# Patient Record
Sex: MALE | Race: WHITE | Employment: FULL TIME | URBAN - METROPOLITAN AREA
[De-identification: names, ages, dates, MRNs, and addresses within clinical notes are randomized per-mention and may not be internally consistent; named-entity substitution may affect disease eponyms.]

---

## 2017-07-22 ENCOUNTER — HOSPITAL ENCOUNTER (EMERGENCY)
Age: 37
Discharge: HOME | End: 2017-07-23
Attending: EMERGENCY MEDICINE
Payer: MEDICAID

## 2017-07-22 VITALS
HEART RATE: 99 BPM | RESPIRATION RATE: 16 BRPM | OXYGEN SATURATION: 98 % | HEIGHT: 70 IN | DIASTOLIC BLOOD PRESSURE: 93 MMHG | TEMPERATURE: 97.7 F | WEIGHT: 190 LBS | SYSTOLIC BLOOD PRESSURE: 130 MMHG | BODY MASS INDEX: 27.2 KG/M2

## 2017-07-22 DIAGNOSIS — T50.901A DRUG OVERDOSE, ACCIDENTAL OR UNINTENTIONAL, INITIAL ENCOUNTER: Primary | ICD-10-CM

## 2017-07-22 PROCEDURE — 99284 EMERGENCY DEPT VISIT MOD MDM: CPT

## 2017-07-22 RX ORDER — ONDANSETRON 4 MG/1
4 TABLET, FILM COATED ORAL ONCE
Status: DISCONTINUED | OUTPATIENT
Start: 2017-07-22 | End: 2017-07-23 | Stop reason: HOSPADM

## 2017-07-22 ASSESSMENT — ENCOUNTER SYMPTOMS
NAUSEA: 0
VOMITING: 0
ABDOMINAL PAIN: 0
COLOR CHANGE: 0
SHORTNESS OF BREATH: 0
COUGH: 0
EYE PAIN: 0

## 2020-10-23 ENCOUNTER — HOSPITAL ENCOUNTER (EMERGENCY)
Age: 40
Discharge: HOME | End: 2020-10-23
Attending: EMERGENCY MEDICINE
Payer: COMMERCIAL

## 2020-10-23 ENCOUNTER — APPOINTMENT (OUTPATIENT)
Dept: GENERAL RADIOLOGY | Age: 40
End: 2020-10-23
Payer: COMMERCIAL

## 2020-10-23 VITALS
BODY MASS INDEX: 25.77 KG/M2 | SYSTOLIC BLOOD PRESSURE: 126 MMHG | OXYGEN SATURATION: 94 % | HEIGHT: 70 IN | HEART RATE: 61 BPM | RESPIRATION RATE: 20 BRPM | TEMPERATURE: 97.7 F | WEIGHT: 180 LBS | DIASTOLIC BLOOD PRESSURE: 70 MMHG

## 2020-10-23 LAB
ABSOLUTE EOS #: 0.3 K/UL (ref 0–0.4)
ABSOLUTE IMMATURE GRANULOCYTE: ABNORMAL K/UL (ref 0–0.3)
ABSOLUTE LYMPH #: 2.3 K/UL (ref 1–4.8)
ABSOLUTE MONO #: 0.4 K/UL (ref 0.1–1.3)
ALBUMIN SERPL-MCNC: 4.3 G/DL (ref 3.5–5.2)
ALBUMIN/GLOBULIN RATIO: ABNORMAL (ref 1–2.5)
ALP BLD-CCNC: 53 U/L (ref 40–129)
ALT SERPL-CCNC: 36 U/L (ref 5–41)
ANION GAP SERPL CALCULATED.3IONS-SCNC: 11 MMOL/L (ref 9–17)
AST SERPL-CCNC: 23 U/L
BASOPHILS # BLD: 1 % (ref 0–2)
BASOPHILS ABSOLUTE: 0.1 K/UL (ref 0–0.2)
BILIRUB SERPL-MCNC: 1.22 MG/DL (ref 0.3–1.2)
BUN BLDV-MCNC: 12 MG/DL (ref 6–20)
BUN/CREAT BLD: ABNORMAL (ref 9–20)
CALCIUM SERPL-MCNC: 9.5 MG/DL (ref 8.6–10.4)
CHLORIDE BLD-SCNC: 104 MMOL/L (ref 98–107)
CO2: 22 MMOL/L (ref 20–31)
CREAT SERPL-MCNC: 0.94 MG/DL (ref 0.7–1.2)
DIFFERENTIAL TYPE: ABNORMAL
EOSINOPHILS RELATIVE PERCENT: 5 % (ref 0–4)
GFR AFRICAN AMERICAN: >60 ML/MIN
GFR NON-AFRICAN AMERICAN: >60 ML/MIN
GFR SERPL CREATININE-BSD FRML MDRD: ABNORMAL ML/MIN/{1.73_M2}
GFR SERPL CREATININE-BSD FRML MDRD: ABNORMAL ML/MIN/{1.73_M2}
GLUCOSE BLD-MCNC: 94 MG/DL (ref 70–99)
HCT VFR BLD CALC: 43.5 % (ref 41–53)
HEMOGLOBIN: 15.4 G/DL (ref 13.5–17.5)
IMMATURE GRANULOCYTES: ABNORMAL %
INR BLD: 1.1
LYMPHOCYTES # BLD: 34 % (ref 24–44)
MAGNESIUM: 2 MG/DL (ref 1.6–2.6)
MCH RBC QN AUTO: 31.4 PG (ref 26–34)
MCHC RBC AUTO-ENTMCNC: 35.5 G/DL (ref 31–37)
MCV RBC AUTO: 88.5 FL (ref 80–100)
MONOCYTES # BLD: 7 % (ref 1–7)
NRBC AUTOMATED: ABNORMAL PER 100 WBC
PDW BLD-RTO: 13.4 % (ref 11.5–14.9)
PLATELET # BLD: 239 K/UL (ref 150–450)
PLATELET ESTIMATE: ABNORMAL
PMV BLD AUTO: 7.1 FL (ref 6–12)
POTASSIUM SERPL-SCNC: 4 MMOL/L (ref 3.7–5.3)
PROTHROMBIN TIME: 14.3 SEC (ref 11.8–14.6)
RBC # BLD: 4.91 M/UL (ref 4.5–5.9)
RBC # BLD: ABNORMAL 10*6/UL
SEG NEUTROPHILS: 53 % (ref 36–66)
SEGMENTED NEUTROPHILS ABSOLUTE COUNT: 3.7 K/UL (ref 1.3–9.1)
SODIUM BLD-SCNC: 137 MMOL/L (ref 135–144)
TOTAL CK: 115 U/L (ref 39–308)
TOTAL PROTEIN: 7.2 G/DL (ref 6.4–8.3)
TROPONIN INTERP: NORMAL
TROPONIN INTERP: NORMAL
TROPONIN T: NORMAL NG/ML
TROPONIN T: NORMAL NG/ML
TROPONIN, HIGH SENSITIVITY: <6 NG/L (ref 0–22)
TROPONIN, HIGH SENSITIVITY: <6 NG/L (ref 0–22)
WBC # BLD: 6.9 K/UL (ref 3.5–11)
WBC # BLD: ABNORMAL 10*3/UL

## 2020-10-23 PROCEDURE — 82550 ASSAY OF CK (CPK): CPT

## 2020-10-23 PROCEDURE — 71045 X-RAY EXAM CHEST 1 VIEW: CPT

## 2020-10-23 PROCEDURE — 85610 PROTHROMBIN TIME: CPT

## 2020-10-23 PROCEDURE — 36415 COLL VENOUS BLD VENIPUNCTURE: CPT

## 2020-10-23 PROCEDURE — 6360000002 HC RX W HCPCS: Performed by: EMERGENCY MEDICINE

## 2020-10-23 PROCEDURE — 85025 COMPLETE CBC W/AUTO DIFF WBC: CPT

## 2020-10-23 PROCEDURE — 93005 ELECTROCARDIOGRAM TRACING: CPT | Performed by: EMERGENCY MEDICINE

## 2020-10-23 PROCEDURE — 83735 ASSAY OF MAGNESIUM: CPT

## 2020-10-23 PROCEDURE — 84484 ASSAY OF TROPONIN QUANT: CPT

## 2020-10-23 PROCEDURE — 6370000000 HC RX 637 (ALT 250 FOR IP): Performed by: EMERGENCY MEDICINE

## 2020-10-23 PROCEDURE — 96374 THER/PROPH/DIAG INJ IV PUSH: CPT

## 2020-10-23 PROCEDURE — 99285 EMERGENCY DEPT VISIT HI MDM: CPT

## 2020-10-23 PROCEDURE — 80053 COMPREHEN METABOLIC PANEL: CPT

## 2020-10-23 RX ORDER — MORPHINE SULFATE 4 MG/ML
4 INJECTION, SOLUTION INTRAMUSCULAR; INTRAVENOUS ONCE
Status: COMPLETED | OUTPATIENT
Start: 2020-10-23 | End: 2020-10-23

## 2020-10-23 RX ORDER — ACETAMINOPHEN 500 MG
500 TABLET ORAL 4 TIMES DAILY PRN
Qty: 120 TABLET | Refills: 0 | Status: SHIPPED | OUTPATIENT
Start: 2020-10-23 | End: 2021-12-10 | Stop reason: SINTOL

## 2020-10-23 RX ORDER — ACETAMINOPHEN 500 MG
1000 TABLET ORAL ONCE
Status: COMPLETED | OUTPATIENT
Start: 2020-10-23 | End: 2020-10-23

## 2020-10-23 RX ADMIN — ACETAMINOPHEN 1000 MG: 500 TABLET, FILM COATED ORAL at 18:09

## 2020-10-23 RX ADMIN — MORPHINE SULFATE 4 MG: 4 INJECTION, SOLUTION INTRAMUSCULAR; INTRAVENOUS at 18:09

## 2020-10-23 ASSESSMENT — ENCOUNTER SYMPTOMS
ABDOMINAL PAIN: 0
CHEST TIGHTNESS: 1
SHORTNESS OF BREATH: 0

## 2020-10-23 ASSESSMENT — PAIN SCALES - GENERAL
PAINLEVEL_OUTOF10: 7
PAINLEVEL_OUTOF10: 3

## 2020-10-23 ASSESSMENT — PAIN DESCRIPTION - LOCATION: LOCATION: CHEST

## 2020-10-23 ASSESSMENT — HEART SCORE: ECG: 1

## 2020-10-23 ASSESSMENT — PAIN DESCRIPTION - PAIN TYPE: TYPE: ACUTE PAIN

## 2020-10-23 NOTE — ED PROVIDER NOTES
Carly    Pt Name: Laura Adkins  MRN: [id number]  [place] 1980  Date of evaluation: 10/23/20  CHIEF COMPLAINT       Chief Complaint   Patient presents with    Chest Pain     x1 year     HISTORY OF PRESENT ILLNESS     Chest Pain   Pain location:  Substernal area  Pain quality: aching    Pain radiates to:  Does not radiate  Pain severity:  Moderate  Onset quality:  Gradual  Duration:  6 months  Timing:  Intermittent  Progression:  Waxing and waning  Chronicity:  New  Relieved by:  Nothing  Worsened by:  Nothing  Ineffective treatments:  None tried  Associated symptoms: no abdominal pain, no palpitations and no shortness of breath      Has chronic low back pain, 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 Negative for shortness of breath. Cardiovascular: Positive for chest pain. Negative for palpitations and leg swelling. Gastrointestinal: Negative for abdominal pain. All other systems reviewed and are negative. PASTMEDICAL HISTORY   History reviewed. No pertinent past medical history. Past Problem List  There is no problem list on file for this patient. SURGICAL HISTORY     History reviewed. No pertinent surgical history. CURRENT MEDICATIONS       Previous Medications    No medications on file     ALLERGIES     has No Known Allergies. FAMILY HISTORY     has no family status information on file. SOCIAL HISTORY       Social History     Tobacco Use    Smoking status: Former Smoker   Substance Use Topics    Alcohol use: Not Currently    Drug use: Not Currently     Types: Marijuana     Comment: hx heroine use; denies currently 10/23/20     PHYSICAL EXAM     INITIAL VITALS: /84   Pulse 75   Temp 97.7 °F (36.5 °C) (Oral)   Resp 20   Ht 5' 10\" (1.778 m)   Wt 180 lb (81.6 kg)   SpO2 97%   BMI 25.83 kg/m²    Physical Exam  Vitals signs reviewed. Constitutional:       General: He is not in acute distress. Appearance: Normal appearance. He is well-developed. He is not diaphoretic. HENT:      Head: Normocephalic and atraumatic. Right Ear: External ear normal.      Left Ear: External ear normal.      Nose: Nose normal. No congestion. Mouth/Throat:      Mouth: Mucous membranes are moist.      Pharynx: Oropharynx is clear. Eyes:      General:         Right eye: No discharge. Left eye: No discharge. Conjunctiva/sclera: Conjunctivae normal.      Pupils: Pupils are equal, round, and reactive to light. Neck:      Musculoskeletal: Normal range of motion and neck supple. Trachea: No tracheal deviation. Cardiovascular:      Rate and Rhythm: Normal rate and regular rhythm. Pulses: Normal pulses. Heart sounds: Normal heart sounds. Pulmonary:      Effort: Pulmonary effort is normal. No respiratory distress. Breath sounds: Normal breath sounds. No stridor. No wheezing or rales. Abdominal:      Palpations: Abdomen is soft. Tenderness: There is no abdominal tenderness. There is no guarding or rebound. Musculoskeletal: Normal range of motion. General: No tenderness or deformity. Skin:     General: Skin is warm and dry. Capillary Refill: Capillary refill takes less than 2 seconds. Findings: No erythema or rash. Neurological:      General: No focal deficit present. Mental Status: He is alert and oriented to person, place, and time. Cranial Nerves: No cranial nerve deficit. Coordination: Coordination normal.   Psychiatric:         Mood and Affect: Mood normal.         Behavior: Behavior normal.         Thought Content:  Thought content normal.         Judgment: Judgment normal.         MEDICAL DECISION MAKING:   Do not suspect AD or PE or AMI or ACS  HEART score 2  Discussed with patient anticipatory guidance, discharge instructions, follow up [facility] PCP 24 hours for [place] 9127  Schedule an appointment as soon as possible for a visit in 1 day      DISCHARGE MEDICATIONS:  New Prescriptions    ACETAMINOPHEN (TYLENOL) 500 MG TABLET    Take 1 tablet by mouth 4 times daily as needed for Pain     Gab Collins MD  Attending Emergency Physician                    Gab Collins MD  10/23/20 2027

## 2020-10-23 NOTE — ED NOTES
Mode of arrival (squad #, walk in, police, etc) : walk in with dad         Chief complaint(s): chest pain         Arrival Note (brief scenario, treatment PTA, etc). :  Patient states he has had intermittent chest pain x1 year. Patient states the chest pain is located on his left side and denies the pain radiating elsewhere. Patient states today the pain worsened shortly after he ate food. Patient states he also had a headache around the time of the chest pain beginning. Patient states he went home and slept and no longer had a headache. Patient states the chest pain is still present. Patient is alert and oriented x4. Patient denies any SOB. C= \"Have you ever felt that you should Cut down on your drinking? \"  No  A= \"Have people Annoyed you by criticizing your drinking? \"  No  G= \"Have you ever felt bad or Guilty about your drinking? \"  No  E= \"Have you ever had a drink as an Eye-opener first thing in the morning to steady your nerves or to help a hangover? \"  No      Deferred []      Reason for deferring: N/A    *If yes to two or more: probable alcohol abuse. *       Nolvia Prince RN  10/23/20 [id number]

## 2020-10-23 NOTE — ED NOTES
Report given to Jacey Rubio from ED. Report method in person   The following was reviewed with receiving RN:   Current vital signs:  /84   Pulse 75   Temp 97.7 °F (36.5 °C) (Oral)   Resp 20   Ht 5' 10\" (1.778 m)   Wt 180 lb (81.6 kg)   SpO2 97%   BMI 25.83 kg/m²                      Any medication or safety alerts were reviewed. Any pending diagnostics and notifications were also reviewed, as well as any safety concerns or issues, abnormal labs, abnormal imaging, and abnormal assessment findings. Questions were answered.            Patricia Barrera RN  10/23/20 1342

## 2020-10-27 LAB
EKG ATRIAL RATE: 66 BPM
EKG P AXIS: 55 DEGREES
EKG P-R INTERVAL: 192 MS
EKG Q-T INTERVAL: 396 MS
EKG QRS DURATION: 88 MS
EKG QTC CALCULATION (BAZETT): 415 MS
EKG R AXIS: 36 DEGREES
EKG T AXIS: 23 DEGREES
EKG VENTRICULAR RATE: 66 BPM

## 2020-10-27 PROCEDURE — 93010 ELECTROCARDIOGRAM REPORT: CPT | Performed by: INTERNAL MEDICINE

## 2020-12-07 ENCOUNTER — TELEPHONE (OUTPATIENT)
Dept: INTERNAL MEDICINE CLINIC | Age: 40
End: 2020-12-07

## 2020-12-07 NOTE — TELEPHONE ENCOUNTER
Called patient. Patient answered. I informed patient that provider would be out of office.  Patient rescheduled appt for 12/28/20 1:00pm

## 2020-12-28 ENCOUNTER — OFFICE VISIT (OUTPATIENT)
Dept: INTERNAL MEDICINE CLINIC | Age: 40
End: 2020-12-28
Payer: COMMERCIAL

## 2020-12-28 VITALS
DIASTOLIC BLOOD PRESSURE: 72 MMHG | OXYGEN SATURATION: 98 % | SYSTOLIC BLOOD PRESSURE: 118 MMHG | WEIGHT: 194 LBS | BODY MASS INDEX: 27.77 KG/M2 | HEART RATE: 80 BPM | HEIGHT: 70 IN | TEMPERATURE: 98.2 F

## 2020-12-28 PROBLEM — R07.89 OTHER CHEST PAIN: Status: ACTIVE | Noted: 2020-12-28

## 2020-12-28 PROCEDURE — 99204 OFFICE O/P NEW MOD 45 MIN: CPT | Performed by: INTERNAL MEDICINE

## 2020-12-28 ASSESSMENT — PATIENT HEALTH QUESTIONNAIRE - PHQ9
2. FEELING DOWN, DEPRESSED OR HOPELESS: 0
1. LITTLE INTEREST OR PLEASURE IN DOING THINGS: 0
SUM OF ALL RESPONSES TO PHQ QUESTIONS 1-9: 0
SUM OF ALL RESPONSES TO PHQ QUESTIONS 1-9: 0
SUM OF ALL RESPONSES TO PHQ9 QUESTIONS 1 & 2: 0
SUM OF ALL RESPONSES TO PHQ QUESTIONS 1-9: 0

## 2020-12-28 ASSESSMENT — ENCOUNTER SYMPTOMS
ABDOMINAL DISTENTION: 0
EYE DISCHARGE: 0
COUGH: 0
COLOR CHANGE: 0
WHEEZING: 0
SHORTNESS OF BREATH: 0
EYE PAIN: 0
TROUBLE SWALLOWING: 0
BLOOD IN STOOL: 0
DIARRHEA: 0

## 2020-12-28 NOTE — PROGRESS NOTES
HENT: Negative for ear discharge and trouble swallowing. Eyes: Negative for pain and discharge. Respiratory: Negative for cough, shortness of breath and wheezing. Cardiovascular: Negative for chest pain and palpitations. Gastrointestinal: Negative for abdominal distention, blood in stool and diarrhea. Endocrine: Negative for polydipsia and polyphagia. Genitourinary: Negative for difficulty urinating and frequency. Musculoskeletal: Negative for gait problem, myalgias and neck pain. Skin: Negative for color change and rash. Allergic/Immunologic: Negative for environmental allergies and food allergies. Neurological: Negative for dizziness and headaches. Hematological: Negative for adenopathy. Does not bruise/bleed easily. Psychiatric/Behavioral: Negative for behavioral problems and sleep disturbance. Objective:     Physical Exam  Constitutional:       Appearance: He is well-developed. He is not diaphoretic. HENT:      Head: Normocephalic and atraumatic. Eyes:      General:         Right eye: No discharge. Left eye: No discharge. Extraocular Movements:      Right eye: Normal extraocular motion. Left eye: Normal extraocular motion. Conjunctiva/sclera: Conjunctivae normal.      Right eye: Right conjunctiva is not injected. Left eye: Left conjunctiva is not injected. Neck:      Musculoskeletal: Normal range of motion and neck supple. No edema or erythema. Thyroid: No thyroid mass or thyromegaly. Vascular: No JVD. Cardiovascular:      Rate and Rhythm: Normal rate and regular rhythm. Heart sounds: No murmur. No friction rub. Pulmonary:      Effort: Pulmonary effort is normal. No tachypnea, bradypnea, accessory muscle usage or respiratory distress. Breath sounds: Normal breath sounds. No wheezing or rales. Abdominal:      General: Bowel sounds are normal. There is no distension. Palpations: Abdomen is soft. Tenderness: There is no abdominal tenderness. There is no rebound. Musculoskeletal: Normal range of motion. General: No tenderness. Lymphadenopathy:      Head:      Right side of head: No submental or submandibular adenopathy. Left side of head: No submental or submandibular adenopathy. Cervical: No cervical adenopathy. Skin:     General: Skin is warm. Coloration: Skin is not pale. Findings: No bruising, ecchymosis or rash. Neurological:      Mental Status: He is alert and oriented to person, place, and time. Cranial Nerves: No cranial nerve deficit. Sensory: No sensory deficit. Motor: No atrophy or abnormal muscle tone. Coordination: Coordination normal.   Psychiatric:         Mood and Affect: Mood is not anxious. Affect is not angry. Speech: Speech is not slurred. Behavior: Behavior normal. Behavior is not aggressive. Thought Content: Thought content does not include homicidal ideation. Cognition and Memory: Memory is not impaired. /72   Pulse 80   Temp 98.2 °F (36.8 °C)   Ht 5' 10\" (1.778 m)   Wt 194 lb (88 kg)   SpO2 98%   BMI 27.84 kg/m²     Assessment:       Diagnosis Orders   1. Other chest pain  Stress test, myoview   2. Screening for hyperlipidemia  Lipid, Fasting             Plan:      Return in about 1 month (around 1/28/2021). Orders Placed This Encounter   Procedures    Lipid, Fasting     Standing Status:   Future     Standing Expiration Date:   12/23/2021    Stress test, myoview     Order Specific Question:   Reason for Exam?     Answer:   Chest pain     No orders of the defined types were placed in this encounter.    aytpical chest pain  canabis use daily  Will do stress  If negative treat for gerd   Trial of otc prolosec Patient given educational materials - see patient instructions. Discussed use, benefit, and side effects of prescribed medications. All patientquestions answered. Pt voiced understanding. Reviewed health maintenance. Instructedto continue current medications, diet and exercise. Patient agreed with treatmentplan. Follow up as directed.      Electronically signed by Basil Valenzuela MD on 12/28/2020 at 1:30 PM

## 2020-12-28 NOTE — PROGRESS NOTES
Visit Information    Have you changed or started any medications since your last visit including any over-the-counter medicines, vitamins, or herbal medicines? no   Are you having any side effects from any of your medications? -  no  Have you stopped taking any of your medications? Is so, why? -  no    Have you seen any other physician or provider since your last visit? No  Have you had any other diagnostic tests since your last visit? No  Have you been seen in the emergency room and/or had an admission to a hospital since we last saw you? yes  Have you had your routine dental cleaning in the past 6 months? yes -     Have you activated your [employer] account? If not, what are your barriers?  No:      No care team member to display    Medical History Review  Past Medical, Family, and Social History reviewed and does not contribute to the patient presenting condition    Health Maintenance   Topic Date Due    Hepatitis C screen  1980    Varicella vaccine (1 of 2 - 2-dose childhood series) 09/13/1981    HIV screen  09/13/1995    DTaP/Tdap/Td vaccine (1 - Tdap) 09/13/1999    Flu vaccine (1) 09/01/2020    Lipid screen  09/13/2020    Hepatitis A vaccine  Aged Out    Hepatitis B vaccine  Aged Out    Hib vaccine  Aged Out    Meningococcal (ACWY) vaccine  Aged Out    Pneumococcal 0-64 years Vaccine  Aged [place]

## 2021-02-02 ENCOUNTER — OFFICE VISIT (OUTPATIENT)
Dept: INTERNAL MEDICINE CLINIC | Age: 41
End: 2021-02-02
Payer: COMMERCIAL

## 2021-02-02 VITALS
BODY MASS INDEX: 28.06 KG/M2 | TEMPERATURE: 98.5 F | HEART RATE: 98 BPM | DIASTOLIC BLOOD PRESSURE: 74 MMHG | WEIGHT: 196 LBS | SYSTOLIC BLOOD PRESSURE: 130 MMHG | HEIGHT: 70 IN | OXYGEN SATURATION: 87 %

## 2021-02-02 DIAGNOSIS — Z13.220 SCREENING FOR HYPERLIPIDEMIA: ICD-10-CM

## 2021-02-02 DIAGNOSIS — R07.89 OTHER CHEST PAIN: Primary | ICD-10-CM

## 2021-02-02 PROCEDURE — 99213 OFFICE O/P EST LOW 20 MIN: CPT | Performed by: INTERNAL MEDICINE

## 2021-02-02 RX ORDER — OMEPRAZOLE 20 MG/1
20 CAPSULE, DELAYED RELEASE ORAL DAILY
Qty: 30 CAPSULE | Refills: 0 | Status: SHIPPED | OUTPATIENT
Start: 2021-02-02 | End: 2021-12-10

## 2021-02-02 ASSESSMENT — ENCOUNTER SYMPTOMS
EYE PAIN: 0
DIARRHEA: 0
BLOOD IN STOOL: 0
COUGH: 0
TROUBLE SWALLOWING: 0
COLOR CHANGE: 0
EYE DISCHARGE: 0
ABDOMINAL DISTENTION: 0
SHORTNESS OF BREATH: 0
WHEEZING: 0

## 2021-02-02 ASSESSMENT — PATIENT HEALTH QUESTIONNAIRE - PHQ9
1. LITTLE INTEREST OR PLEASURE IN DOING THINGS: 0
SUM OF ALL RESPONSES TO PHQ9 QUESTIONS 1 & 2: 0
2. FEELING DOWN, DEPRESSED OR HOPELESS: 0

## 2021-02-02 NOTE — PROGRESS NOTES
[address] [address]  Dept: [phone]  Dept Fax: [phone]    Tesfaye Baum is a 36 y.o. male who presents today for his medical conditions/complaintsas noted below. Tesfaye Baum is c/o of   Chief Complaint   Patient presents with    Chest Pain     follow up          HPI:     Chest apin worsei with pop pepsi        No results found for: LABA1C          ( goal A1Cis < 7)   No results found for: LABMICR  No results found for: LDLCHOLESTEROL, LDLCALC    (goal LDL is <100)   AST (U/L)   Date Value   10/23/2020 23     ALT (U/L)   Date Value   10/23/2020 36     BUN (mg/dL)   Date Value   10/23/2020 12     BP Readings from Last 3 Encounters:   02/02/21 130/74   12/28/20 118/72   10/23/20 126/70          (goal 120/80)    No past medical history on file. No past surgical history on file. No family history on file. Social History     Tobacco Use    Smoking status: Former Smoker    Smokeless tobacco: Never Used   Substance Use Topics    Alcohol use: Not Currently      Current Outpatient Medications   Medication Sig Dispense Refill    omeprazole (PRILOSEC) 20 MG delayed release capsule Take 1 capsule by mouth Daily 30 capsule 0    acetaminophen (TYLENOL) 500 MG tablet Take 1 tablet by mouth 4 times daily as needed for Pain (Patient not taking: Reported on 12/28/2020) 120 tablet 0     No current facility-administered medications for this visit.       No Known Allergies    Health Maintenance   Topic Date Due    Hepatitis C screen  1980    Varicella vaccine (1 of 2 - 2-dose childhood series) 09/13/1981    HIV screen  09/13/1995    DTaP/Tdap/Td vaccine (1 - Tdap) 09/13/1999    Flu vaccine (1) 09/01/2020    Lipid screen  09/13/2020    Hepatitis A vaccine  Aged Out    Hepatitis B vaccine  Aged Out    Hib vaccine  Aged Out    Meningococcal (ACWY) vaccine  Aged Out    Pneumococcal 0-64 years Vaccine  Aged Out       Subjective: Review of Systems   Constitutional: Negative for appetite change, diaphoresis and fatigue. HENT: Negative for ear discharge and trouble swallowing. Eyes: Negative for pain and discharge. Respiratory: Negative for cough, shortness of breath and wheezing. Chest pain worse with coke pepsis   Cardiovascular: Negative for chest pain and palpitations. Gastrointestinal: Negative for abdominal distention, blood in stool and diarrhea. Endocrine: Negative for polydipsia and polyphagia. Genitourinary: Negative for difficulty urinating and frequency. Musculoskeletal: Negative for gait problem, myalgias and neck pain. Skin: Negative for color change and rash. Allergic/Immunologic: Negative for environmental allergies and food allergies. Neurological: Negative for dizziness and headaches. Hematological: Negative for adenopathy. Does not bruise/bleed easily. Psychiatric/Behavioral: Negative for behavioral problems and sleep disturbance. Objective:     Physical Exam  Constitutional:       Appearance: He is well-developed. He is not diaphoretic. HENT:      Head: Normocephalic and atraumatic. Eyes:      General:         Right eye: No discharge. Left eye: No discharge. Extraocular Movements:      Right eye: Normal extraocular motion. Left eye: Normal extraocular motion. Conjunctiva/sclera: Conjunctivae normal.      Right eye: Right conjunctiva is not injected. Left eye: Left conjunctiva is not injected. Neck:      Musculoskeletal: Normal range of motion and neck supple. No edema or erythema. Thyroid: No thyroid mass or thyromegaly. Vascular: No JVD. Cardiovascular:      Rate and Rhythm: Normal rate and regular rhythm. Heart sounds: No murmur. No friction rub. Pulmonary:      Effort: Pulmonary effort is normal. No tachypnea, bradypnea, accessory muscle usage or respiratory distress. Breath sounds: Normal breath sounds. No wheezing or rales. Abdominal:      General: Bowel sounds are normal. There is no distension. Palpations: Abdomen is soft. Tenderness: There is no abdominal tenderness. There is no rebound. Musculoskeletal: Normal range of motion. General: No tenderness. Lymphadenopathy:      Head:      Right side of head: No submental or submandibular adenopathy. Left side of head: No submental or submandibular adenopathy. Cervical: No cervical adenopathy. Skin:     General: Skin is warm. Coloration: Skin is not pale. Findings: No bruising, ecchymosis or rash. Neurological:      Mental Status: He is alert and oriented to person, place, and time. Cranial Nerves: No cranial nerve deficit. Sensory: No sensory deficit. Motor: No atrophy or abnormal muscle tone. Coordination: Coordination normal.   Psychiatric:         Mood and Affect: Mood is not anxious. Affect is not angry. Speech: Speech is not slurred. Behavior: Behavior normal. Behavior is not aggressive. Thought Content: Thought content does not include homicidal ideation. Cognition and Memory: Memory is not impaired. /74   Pulse 98   Temp 98.5 °F (36.9 °C)   Ht 5' 10\" (1.778 m)   Wt 196 lb (88.9 kg)   SpO2 (!) 87%   BMI 28.12 kg/m²     Assessment:       Diagnosis Orders   1. Other chest pain     2. Screening for hyperlipidemia               Plan:      No follow-ups on file. No orders of the defined types were placed in this encounter.     Orders Placed This Encounter   Medications    omeprazole (PRILOSEC) 20 MG delayed release capsule     Sig: Take 1 capsule by mouth Daily     Dispense:  30 capsule     Refill:  0    atypical chest pain much better    Likely gerd vs gastrtiis  Pt did not do stress test  Low suspicion for cardiac  Worse with coke and pepsi  Will do  Trial of ppi  See back in a  Month If not better gi for egd     Patient given educational materials - see patient instructions. Discussed use, benefit, and side effects of prescribed medications. All patientquestions answered. Pt voiced understanding. Reviewed health maintenance. Instructedto continue current medications, diet and exercise. Patient agreed with treatmentplan. Follow up as directed.      Electronically signed by Mumtaz Medina MD on 2/2/2021 at 1:44 PM

## 2021-03-08 ENCOUNTER — OFFICE VISIT (OUTPATIENT)
Dept: INTERNAL MEDICINE CLINIC | Age: 41
End: 2021-03-08
Payer: COMMERCIAL

## 2021-03-08 VITALS
BODY MASS INDEX: 28.35 KG/M2 | WEIGHT: 198 LBS | HEART RATE: 83 BPM | OXYGEN SATURATION: 97 % | DIASTOLIC BLOOD PRESSURE: 74 MMHG | TEMPERATURE: 97.4 F | SYSTOLIC BLOOD PRESSURE: 122 MMHG | HEIGHT: 70 IN

## 2021-03-08 DIAGNOSIS — K21.00 GASTROESOPHAGEAL REFLUX DISEASE WITH ESOPHAGITIS WITHOUT HEMORRHAGE: Primary | ICD-10-CM

## 2021-03-08 DIAGNOSIS — Z13.220 SCREENING FOR HYPERLIPIDEMIA: ICD-10-CM

## 2021-03-08 DIAGNOSIS — R07.89 OTHER CHEST PAIN: ICD-10-CM

## 2021-03-08 PROCEDURE — 99214 OFFICE O/P EST MOD 30 MIN: CPT | Performed by: INTERNAL MEDICINE

## 2021-03-08 ASSESSMENT — ENCOUNTER SYMPTOMS
COLOR CHANGE: 0
DIARRHEA: 0
ABDOMINAL DISTENTION: 0
COUGH: 0
TROUBLE SWALLOWING: 0
BLOOD IN STOOL: 0
SHORTNESS OF BREATH: 0
WHEEZING: 0
EYE DISCHARGE: 0
EYE PAIN: 0

## 2021-03-08 NOTE — PROGRESS NOTES
[address] [address]  Dept: [phone]  Dept Fax: [phone]    Ian Camacho is a 36 y.o. male who presents today for his medical conditions/complaintsas noted below. Ian Camacho is c/o of   Chief Complaint   Patient presents with    Chest Pain     follow up          HPI:     Chest pain  Better  But not present        No results found for: LABA1C          ( goal A1Cis < 7)   No results found for: LABMICR  No results found for: LDLCHOLESTEROL, LDLCALC    (goal LDL is <100)   AST (U/L)   Date Value   10/23/2020 23     ALT (U/L)   Date Value   10/23/2020 36     BUN (mg/dL)   Date Value   10/23/2020 12     BP Readings from Last 3 Encounters:   03/08/21 122/74   02/02/21 130/74   12/28/20 118/72          (goal 120/80)    No past medical history on file. No past surgical history on file. No family history on file. Social History     Tobacco Use    Smoking status: Former Smoker    Smokeless tobacco: Never Used   Substance Use Topics    Alcohol use: Not Currently      Current Outpatient Medications   Medication Sig Dispense Refill    omeprazole (PRILOSEC) 20 MG delayed release capsule Take 1 capsule by mouth Daily 30 capsule 0    acetaminophen (TYLENOL) 500 MG tablet Take 1 tablet by mouth 4 times daily as needed for Pain (Patient not taking: Reported on 3/8/2021) 120 tablet 0     No current facility-administered medications for this visit.       No Known Allergies    Health Maintenance   Topic Date Due    Hepatitis C screen  Never done    Varicella vaccine (1 of 2 - 2-dose childhood series) Never done    HIV screen  Never done    DTaP/Tdap/Td vaccine (1 - Tdap) Never done    Flu vaccine (1) Never done    Lipid screen  Never done    Hepatitis A vaccine  Aged Out    Hepatitis B vaccine  Aged Out    Hib vaccine  Aged Out    Meningococcal (ACWY) vaccine  Aged Out    Pneumococcal 0-64 years Vaccine  Aged Out       Subjective:     Review of Systems   Constitutional: Negative for appetite change, diaphoresis and fatigue. HENT: Negative for ear discharge and trouble swallowing. Eyes: Negative for pain and discharge. Respiratory: Negative for cough, shortness of breath and wheezing. Cardiovascular: Negative for chest pain and palpitations. Gastrointestinal: Negative for abdominal distention, blood in stool and diarrhea. Endocrine: Negative for polydipsia and polyphagia. Genitourinary: Negative for difficulty urinating and frequency. Musculoskeletal: Negative for gait problem, myalgias and neck pain. Skin: Negative for color change and rash. Allergic/Immunologic: Negative for environmental allergies and food allergies. Neurological: Negative for dizziness and headaches. Hematological: Negative for adenopathy. Does not bruise/bleed easily. Psychiatric/Behavioral: Negative for behavioral problems and sleep disturbance. Objective:     Physical Exam  Constitutional:       Appearance: He is well-developed. He is not diaphoretic. HENT:      Head: Normocephalic and atraumatic. Eyes:      General:         Right eye: No discharge. Left eye: No discharge. Extraocular Movements:      Right eye: Normal extraocular motion. Left eye: Normal extraocular motion. Conjunctiva/sclera: Conjunctivae normal.      Right eye: Right conjunctiva is not injected. Left eye: Left conjunctiva is not injected. Neck:      Musculoskeletal: Normal range of motion and neck supple. No edema or erythema. Thyroid: No thyroid mass or thyromegaly. Vascular: No JVD. Cardiovascular:      Rate and Rhythm: Normal rate and regular rhythm. Heart sounds: No murmur. No friction rub. Pulmonary:      Effort: Pulmonary effort is normal. No tachypnea, bradypnea, accessory muscle usage or respiratory distress. Breath sounds: Normal breath sounds. No wheezing or rales.    Abdominal:      General: Bowel sounds are normal. There is no distension. Palpations: Abdomen is soft. Tenderness: There is no abdominal tenderness. There is no rebound. Musculoskeletal: Normal range of motion. General: No tenderness. Lymphadenopathy:      Head:      Right side of head: No submental or submandibular adenopathy. Left side of head: No submental or submandibular adenopathy. Cervical: No cervical adenopathy. Skin:     General: Skin is warm. Coloration: Skin is not pale. Findings: No bruising, ecchymosis or rash. Neurological:      Mental Status: He is alert and oriented to person, place, and time. Cranial Nerves: No cranial nerve deficit. Sensory: No sensory deficit. Motor: No atrophy or abnormal muscle tone. Coordination: Coordination normal.   Psychiatric:         Mood and Affect: Mood is not anxious. Affect is not angry. Speech: Speech is not slurred. Behavior: Behavior normal. Behavior is not aggressive. Thought Content: Thought content does not include homicidal ideation. Cognition and Memory: Memory is not impaired. /74   Pulse 83   Temp 97.4 °F (36.3 °C)   Ht 5' 10\" (1.778 m)   Wt 198 lb (89.8 kg)   SpO2 97%   BMI 28.41 kg/m²     Assessment:       Diagnosis Orders   1. Gastroesophageal reflux disease with esophagitis without hemorrhage  Matilda Page MD, Gastroenterology, [address] [place] [address]   2. Screening for hyperlipidemia  Lipid, Fasting   3. Other chest pain  Matilda Page MD, Gastroenterology, [address] [place] [address] St:      Return in about 2 months (around 5/8/2021).     Orders Placed This Encounter   Procedures    NM HEPATOBILIARY     Standing Status:   Future     Standing Expiration Date:   3/8/2022     Order Specific Question:   Reason for exam:     Answer:   ? biliary diskinesia    Lipid, Fasting     Standing Status:   Future     Standing Expiration Date:   3/5/2022 Yany Lee MD, Gastroenterology, [place]     Referral Priority:   Routine     Referral Type:   Eval and Treat     Referral Reason:   Specialty Services Required     Referred to Provider:   Galo Rivera MD     Requested Specialty:   Gastroenterology     Number of Visits Requested:   1     No orders of the defined types were placed in this encounter. chest pain is better with ppi  But not resolved  Ref to gi  Will do hida scan  Cont ppi     Patient given educational materials - see patient instructions. Discussed use, benefit, and side effects of prescribed medications. All patientquestions answered. Pt voiced understanding. Reviewed health maintenance. Instructedto continue current medications, diet and exercise. Patient agreed with treatmentplan. Follow up as directed.      Electronically signed by Lokesh Mao MD on 3/8/2021 at 10:31 AM

## 2021-03-31 ENCOUNTER — TELEPHONE (OUTPATIENT)
Dept: GASTROENTEROLOGY | Age: 41
End: 2021-03-31

## 2021-03-31 NOTE — TELEPHONE ENCOUNTER
Attempt # 1 called [facility] for patient to return our call and schedule per referral     Attempt # 2 also mailing letter

## 2021-05-12 ENCOUNTER — TELEPHONE (OUTPATIENT)
Dept: INTERNAL MEDICINE CLINIC | Age: 41
End: 2021-05-12

## 2021-05-12 NOTE — TELEPHONE ENCOUNTER
Writer informed pt that he will be receiving a no show ltr in the mail for his missed appt w/ Dr. Aubery Merlin sch'd on 5/11/21. Pt voiced understanding. Ltr mailed.

## 2021-08-04 NOTE — TELEPHONE ENCOUNTER
Attempt # 3 called to schedule per referral had to leave a message for patient to call back     Final Attempt mailing letter

## 2021-12-10 ENCOUNTER — OFFICE VISIT (OUTPATIENT)
Dept: INTERNAL MEDICINE CLINIC | Age: 41
End: 2021-12-10
Payer: COMMERCIAL

## 2021-12-10 VITALS
SYSTOLIC BLOOD PRESSURE: 116 MMHG | DIASTOLIC BLOOD PRESSURE: 70 MMHG | BODY MASS INDEX: 26.54 KG/M2 | WEIGHT: 185 LBS | OXYGEN SATURATION: 98 % | HEART RATE: 67 BPM

## 2021-12-10 DIAGNOSIS — Z00.00 ENCOUNTER FOR WELLNESS EXAMINATION IN ADULT: Primary | ICD-10-CM

## 2021-12-10 PROCEDURE — 99396 PREV VISIT EST AGE 40-64: CPT | Performed by: INTERNAL MEDICINE

## 2021-12-10 SDOH — ECONOMIC STABILITY: TRANSPORTATION INSECURITY
IN THE PAST 12 MONTHS, HAS LACK OF TRANSPORTATION KEPT YOU FROM MEETINGS, WORK, OR FROM GETTING THINGS NEEDED FOR DAILY LIVING?: NO

## 2021-12-10 SDOH — ECONOMIC STABILITY: FOOD INSECURITY: WITHIN THE PAST 12 MONTHS, YOU WORRIED THAT YOUR FOOD WOULD RUN OUT BEFORE YOU GOT MONEY TO BUY MORE.: NEVER TRUE

## 2021-12-10 SDOH — ECONOMIC STABILITY: TRANSPORTATION INSECURITY
IN THE PAST 12 MONTHS, HAS THE LACK OF TRANSPORTATION KEPT YOU FROM MEDICAL APPOINTMENTS OR FROM GETTING MEDICATIONS?: NO

## 2021-12-10 SDOH — ECONOMIC STABILITY: FOOD INSECURITY: WITHIN THE PAST 12 MONTHS, THE FOOD YOU BOUGHT JUST DIDN'T LAST AND YOU DIDN'T HAVE MONEY TO GET MORE.: NEVER TRUE

## 2021-12-10 ASSESSMENT — SOCIAL DETERMINANTS OF HEALTH (SDOH): HOW HARD IS IT FOR YOU TO PAY FOR THE VERY BASICS LIKE FOOD, HOUSING, MEDICAL CARE, AND HEATING?: NOT HARD AT ALL

## 2021-12-10 NOTE — PROGRESS NOTES
[address] [facility] [address]  [place] [address]  Dept: [phone]  Dept Fax: [phone]     Name: Avel Ashley  : 1980           Chief Complaint   Patient presents with    Annual Exam       History of Present Illness:    [facility]  Here for adult wellness exam,  No complaints today,    Past Medical History:    No past medical history on file. Reviewed all health maintenance requirements and ordered appropriate tests  Health Maintenance Due   Topic Date Due    Hepatitis C screen  Never done    Varicella vaccine (1 of 2 - 2-dose childhood series) Never done    COVID-19 Vaccine (1) Never done    HIV screen  Never done    DTaP/Tdap/Td vaccine (1 - Tdap) Never done    Lipid screen  Never done    Flu vaccine (1) Never done       Past Surgical History:    No past surgical history on file. Medications:    No current outpatient medications on file. Allergies:      Patient has no known allergies. Social History:    Tobacco:    reports that he has quit smoking. He has never used smokeless tobacco.  Alcohol:      reports previous alcohol use. Drug Use:  reports previous drug use. Drug: Marijuana Lilianmariia Yee). Family History:    No family history on file.     Review of Systems:    Positive and Negative as described in HPI    Constitutional:  negative for  fevers, chills, sweats, fatigue, and weight loss  HEENT:  negative for vision or hearing changes,   Respiratory:  negative for shortness of breath, cough, or congestion  Cardiovascular:  negative for  chest pain, palpitations  Gastrointestinal:  negative for nausea, vomiting, diarrhea, constipation, abdominal pain  Genitourinary:  negative for frequency, dysuria  Integument/Breast:  negative for rash, skin lesions  Musculoskeletal:  negative for muscle aches or joint pain  Neurological:  negative for headaches, dizziness, lightheadedness, numbness, pain and tingling extrimities  Behavior/Psych:  negative for depression and anxiety      Physical Exam:    Vitals:  /70 (Site: Left Upper Arm)   Pulse 67   Wt 185 lb (83.9 kg)   SpO2 98%   BMI 26.54 kg/m²     General appearance - alert, well appearing, and in no acute distress  Mental status - oriented to person, place, and time with normal affect  Head - normocephalic and atraumatic  Eyes - pupils equal and reactive, extraocular eye movements intact, conjunctiva clear  Ears - hearing appears to be intact  Nose - no drainage noted  Mouth - mucous membranes moist  Neck - supple, no carotid bruits, thyroid not palpable  Chest - clear to auscultation, normal effort  Heart - normal rate, regular rhythm, no murmurs  Abdomen - soft, nontender, nondistended, bowel sounds present all four quadrants, no masses, hepatomegaly or splenomegaly  Neurological - normal speech, no focal findings or movement disorder noted, cranial nerves II through XII grossly intact  Extremities - peripheral pulses palpable, no pedal edema or calf pain with palpation  Skin - no gross lesions, rashes, or induration noted      Data:    Lab Results   Component Value Date     10/23/2020    K 4.0 10/23/2020     10/23/2020    CO2 22 10/23/2020    BUN 12 10/23/2020    CREATININE 0.94 10/23/2020    GLUCOSE 94 10/23/2020    PROT 7.2 10/23/2020    LABALBU 4.3 10/23/2020    BILITOT 1.22 10/23/2020    ALKPHOS 53 10/23/2020    AST 23 10/23/2020    ALT 36 10/23/2020     Lab Results   Component Value Date    WBC 6.9 10/23/2020    RBC 4.91 10/23/2020    HGB 15.4 10/23/2020    HCT 43.5 10/23/2020    MCV 88.5 10/23/2020    MCH 31.4 10/23/2020    MCHC 35.5 10/23/2020    RDW 13.4 10/23/2020     10/23/2020    MPV 7.1 10/23/2020     No results found for: TSH  No results found for: CHOL, HDL, PSA, LABA1C       Assessment & Plan:     Diagnosis Orders   1.  Encounter for wellness examination in adult  CBC Auto Differential    Comprehensive Metabolic Panel    Lipid Panel    Urinalysis       Here for  wellness exam,  We will do the basic blood work  CBC, CMP, lipid profile, urinalysis          Completed Refills   Requested Prescriptions      No prescriptions requested or ordered in this encounter     Return in about 6 months (around 6/10/2022). No orders of the defined types were placed in this encounter. Orders Placed This Encounter   Procedures    CBC Auto Differential     Standing Status:   Future     Standing Expiration Date:   12/10/2022    Comprehensive Metabolic Panel     Standing Status:   Future     Standing Expiration Date:   12/10/2022    Lipid Panel     Standing Status:   Future     Standing Expiration Date:   12/10/2022     Order Specific Question:   Is Patient Fasting?/# of Hours     Answer:   8-10 Hours    Urinalysis     Standing Status:   Future     Standing Expiration Date:   12/10/2022     Order Specific Question:   SPECIFY(EX-CATH,MIDSTREAM,CYSTO,ETC)?      Answer:   Mid Stream       Electronically signed by Tin Corona MD on 12/10/2021 at 9:56 AM

## 2021-12-10 NOTE — PROGRESS NOTES
Visit Information    Have you changed or started any medications since your last visit including any over-the-counter medicines, vitamins, or herbal medicines? no   Are you having any side effects from any of your medications? -  no  Have you stopped taking any of your medications? Is so, why? -  no    Have you seen any other physician or provider since your last visit? No  Have you had any other diagnostic tests since your last visit? No  Have you been seen in the emergency room and/or had an admission to a hospital since we last saw you? No  Have you had your routine dental cleaning in the past 6 months? yes -     Have you activated your [employer] account? If not, what are your barriers?  No:      Patient Care Team:  Shameka Larson MD as PCP - General (Internal Medicine)  Shameka Larson MD as PCP - [facility]    Medical History Review  Past Medical, Family, and Social History reviewed and does not contribute to the patient presenting condition    Health Maintenance   Topic Date Due    Hepatitis C screen  Never done    Varicella vaccine (1 of 2 - 2-dose childhood series) Never done    COVID-19 Vaccine (1) Never done    HIV screen  Never done    DTaP/Tdap/Td vaccine (1 - Tdap) Never done    Lipid screen  Never done    Flu vaccine (1) Never done    Hepatitis A vaccine  Aged Out    Hepatitis B vaccine  Aged Out    Hib vaccine  Aged Out    Meningococcal (ACWY) vaccine  Aged Out    Pneumococcal 0-64 years Vaccine  Aged Out

## 2022-06-06 ENCOUNTER — OFFICE VISIT (OUTPATIENT)
Dept: INTERNAL MEDICINE CLINIC | Age: 42
End: 2022-06-06
Payer: COMMERCIAL

## 2022-06-06 VITALS
HEIGHT: 70 IN | DIASTOLIC BLOOD PRESSURE: 74 MMHG | WEIGHT: 192 LBS | OXYGEN SATURATION: 100 % | SYSTOLIC BLOOD PRESSURE: 122 MMHG | BODY MASS INDEX: 27.49 KG/M2 | HEART RATE: 63 BPM

## 2022-06-06 DIAGNOSIS — K21.00 GASTROESOPHAGEAL REFLUX DISEASE WITH ESOPHAGITIS WITHOUT HEMORRHAGE: ICD-10-CM

## 2022-06-06 DIAGNOSIS — B36.9 SKIN DISEASE, FUNGAL: Primary | ICD-10-CM

## 2022-06-06 DIAGNOSIS — Z13.220 SCREENING FOR HYPERLIPIDEMIA: ICD-10-CM

## 2022-06-06 DIAGNOSIS — Z00.00 ENCOUNTER FOR WELL ADULT EXAM WITHOUT ABNORMAL FINDINGS: ICD-10-CM

## 2022-06-06 PROCEDURE — 99214 OFFICE O/P EST MOD 30 MIN: CPT | Performed by: INTERNAL MEDICINE

## 2022-06-06 ASSESSMENT — PATIENT HEALTH QUESTIONNAIRE - PHQ9
SUM OF ALL RESPONSES TO PHQ QUESTIONS 1-9: 0
SUM OF ALL RESPONSES TO PHQ9 QUESTIONS 1 & 2: 0
1. LITTLE INTEREST OR PLEASURE IN DOING THINGS: 0
SUM OF ALL RESPONSES TO PHQ QUESTIONS 1-9: 0
2. FEELING DOWN, DEPRESSED OR HOPELESS: 0
SUM OF ALL RESPONSES TO PHQ QUESTIONS 1-9: 0
SUM OF ALL RESPONSES TO PHQ QUESTIONS 1-9: 0

## 2022-06-06 ASSESSMENT — ENCOUNTER SYMPTOMS
ABDOMINAL DISTENTION: 0
EYE DISCHARGE: 0
EYE PAIN: 0
TROUBLE SWALLOWING: 0
BLOOD IN STOOL: 0
DIARRHEA: 0
COLOR CHANGE: 0
SHORTNESS OF BREATH: 0
COUGH: 0
WHEEZING: 0

## 2022-06-06 NOTE — PROGRESS NOTES
Visit Information    Have you changed or started any medications since your last visit including any over-the-counter medicines, vitamins, or herbal medicines? no   Are you having any side effects from any of your medications? -  no  Have you stopped taking any of your medications? Is so, why? -  no    Have you seen any other physician or provider since your last visit? No  Have you had any other diagnostic tests since your last visit? No  Have you been seen in the emergency room and/or had an admission to a hospital since we last saw you? No  Have you had your routine dental cleaning in the past 6 months? yes -     Have you activated your [employer] account? If not, what are your barriers?  No:      Patient Care Team:  Judith See MD as PCP - General (Internal Medicine)  Judith See MD as PCP - [facility] Provider    Medical History Review  Past Medical, Family, and Social History reviewed and does contribute to the patient presenting condition    Health Maintenance   Topic Date Due    Varicella vaccine (1 of 2 - 2-dose childhood series) Never done    HIV screen  Never done    Hepatitis C screen  Never done    DTaP/Tdap/Td vaccine (1 - Tdap) Never done    Lipids  Never done    COVID-19 Vaccine (3 - Booster for Moderna series) 10/20/2021    Depression Screen  02/02/2022    Flu vaccine (Season Ended) 09/01/2022    Hepatitis A vaccine  Aged Out    Hepatitis B vaccine  Aged Out    Hib vaccine  Aged Out    Meningococcal (ACWY) vaccine  Aged Out    Pneumococcal 0-64 years Vaccine  Aged Out

## 2022-06-06 NOTE — PROGRESS NOTES
Well Adult Note  Name: Jt Rice Date: 2022   MRN: [id number] Sex: Male   Age: 39 y.o. Ethnicity: Unavailable / Unknown   : 1980 Race: White (non-)      Hill Bettencourt is here for well adult exam.  History:  No np  No cp  Changing job  No smoker        Review of Systems   Constitutional: Negative for appetite change, diaphoresis and fatigue. HENT: Negative for ear discharge and trouble swallowing. Eyes: Negative for pain and discharge. Respiratory: Negative for cough, shortness of breath and wheezing. Cardiovascular: Negative for chest pain and palpitations. Gastrointestinal: Negative for abdominal distention, blood in stool and diarrhea. Endocrine: Negative for polydipsia and polyphagia. Genitourinary: Negative for difficulty urinating and frequency. Musculoskeletal: Negative for gait problem, myalgias and neck pain. Skin: Positive for rash. Negative for color change. Allergic/Immunologic: Negative for environmental allergies and food allergies. Neurological: Negative for dizziness and headaches. Hematological: Negative for adenopathy. Does not bruise/bleed easily. Psychiatric/Behavioral: Negative for behavioral problems and sleep disturbance. No Known Allergies      Prior to Visit Medications    Medication Sig Taking? Authorizing Provider   miconazole (MICOTIN) 2 % cream Apply topically 2 times daily. Yes Wali Butcher MD       No past medical history on file. No past surgical history on file. No family history on file.     Social History     Tobacco Use    Smoking status: Former Smoker    Smokeless tobacco: Never Used   Substance Use Topics    Alcohol use: Not Currently    Drug use: Not Currently     Types: Marijuana (Weed)     Comment: hx heroine use; denies currently 10/23/20       Objective   /74   Pulse 63   Ht 5' 10\" (1.778 m)   Wt 192 lb (87.1 kg)   SpO2 100%   BMI 27.55 kg/m²   Wt Readings from Last 3 Encounters: 06/06/22 192 lb (87.1 kg)   12/10/21 185 lb (83.9 kg)   03/08/21 198 lb (89.8 kg)     There were no vitals filed for this visit. Physical Exam  Constitutional:       Appearance: He is well-developed. He is not diaphoretic. HENT:      Head: Normocephalic and atraumatic. Eyes:      General:         Right eye: No discharge. Left eye: No discharge. Extraocular Movements:      Right eye: Normal extraocular motion. Left eye: Normal extraocular motion. Conjunctiva/sclera: Conjunctivae normal.      Right eye: Right conjunctiva is not injected. Left eye: Left conjunctiva is not injected. Neck:      Thyroid: No thyroid mass or thyromegaly. Vascular: No JVD. Cardiovascular:      Rate and Rhythm: Normal rate and regular rhythm. Heart sounds: No murmur heard. No friction rub. Pulmonary:      Effort: Pulmonary effort is normal. No tachypnea, bradypnea, accessory muscle usage or respiratory distress. Breath sounds: Normal breath sounds. No wheezing or rales. Abdominal:      General: Bowel sounds are normal. There is no distension. Palpations: Abdomen is soft. Tenderness: There is no abdominal tenderness. There is no rebound. Musculoskeletal:         General: No tenderness. Normal range of motion. Cervical back: Normal range of motion and neck supple. No edema or erythema. Lymphadenopathy:      Head:      Right side of head: No submental or submandibular adenopathy. Left side of head: No submental or submandibular adenopathy. Cervical: No cervical adenopathy. Skin:     General: Skin is warm. Coloration: Skin is not pale. Findings: No bruising, ecchymosis or rash. Comments: Skin fungal in left arm   Neurological:      Mental Status: He is alert and oriented to person, place, and time. Cranial Nerves: No cranial nerve deficit. Sensory: No sensory deficit. Motor: No atrophy or abnormal muscle tone. Coordination: Coordination normal.   Psychiatric:         Mood and Affect: Mood is not anxious. Affect is not angry. Speech: Speech is not slurred. Behavior: Behavior normal. Behavior is not aggressive. Thought Content: Thought content does not include homicidal ideation. Cognition and Memory: Memory is not impaired. Assessment   Plan   1. Skin disease, fungal  2. Screening for hyperlipidemia  -     Lipid Panel; Future  3. Encounter for well adult exam without abnormal findings  -     CBC with Auto Differential; Future  -     Comprehensive Metabolic Panel; Future  4. Gastroesophageal reflux disease with esophagitis without hemorrhage         Personalized Preventive Plan   Current Health Maintenance Status  Immunization History   Administered Date(s) Administered    COVID-19, Ismael Batres, Primary or Immunocompromised, PF, 100mcg/0.5mL 04/22/2021, 05/20/2021        Health Maintenance   Topic Date Due    Varicella vaccine (1 of 2 - 2-dose childhood series) Never done    HIV screen  Never done    Hepatitis C screen  Never done    DTaP/Tdap/Td vaccine (1 - Tdap) Never done    Lipids  Never done    COVID-19 Vaccine (3 - Booster for Moderna series) 10/20/2021    Depression Screen  02/02/2022    Flu vaccine (Season Ended) 09/01/2022    Hepatitis A vaccine  Aged Out    Hepatitis B vaccine  Aged Out    Hib vaccine  Aged Out    Meningococcal (ACWY) vaccine  Aged Out    Pneumococcal 0-64 years Vaccine  Aged Out     Recommendations for [place] Due: see orders and patient instructions/AVS.    No follow-ups on file.

## 2023-12-12 ENCOUNTER — OFFICE VISIT (OUTPATIENT)
Dept: INTERNAL MEDICINE CLINIC | Age: 43
End: 2023-12-12
Payer: COMMERCIAL

## 2023-12-12 VITALS
OXYGEN SATURATION: 97 % | WEIGHT: 188 LBS | DIASTOLIC BLOOD PRESSURE: 66 MMHG | HEIGHT: 70 IN | HEART RATE: 56 BPM | BODY MASS INDEX: 26.92 KG/M2 | SYSTOLIC BLOOD PRESSURE: 116 MMHG

## 2023-12-12 DIAGNOSIS — Z00.00 WELL ADULT EXAM: ICD-10-CM

## 2023-12-12 DIAGNOSIS — M25.531 WRIST PAIN, RIGHT: Primary | ICD-10-CM

## 2023-12-12 DIAGNOSIS — N52.9 ERECTILE DYSFUNCTION, UNSPECIFIED ERECTILE DYSFUNCTION TYPE: ICD-10-CM

## 2023-12-12 PROCEDURE — 99213 OFFICE O/P EST LOW 20 MIN: CPT | Performed by: INTERNAL MEDICINE

## 2023-12-12 RX ORDER — ACETAMINOPHEN 325 MG/1
650 TABLET ORAL EVERY 6 HOURS PRN
COMMUNITY

## 2023-12-12 RX ORDER — PREDNISONE 20 MG/1
20 TABLET ORAL DAILY
Qty: 7 TABLET | Refills: 0 | Status: SHIPPED | OUTPATIENT
Start: 2023-12-12 | End: 2023-12-19

## 2023-12-12 RX ORDER — SILDENAFIL 100 MG/1
100 TABLET, FILM COATED ORAL DAILY PRN
Qty: 30 TABLET | Refills: 1 | Status: SHIPPED | OUTPATIENT
Start: 2023-12-12

## 2023-12-12 SDOH — ECONOMIC STABILITY: FOOD INSECURITY: WITHIN THE PAST 12 MONTHS, THE FOOD YOU BOUGHT JUST DIDN'T LAST AND YOU DIDN'T HAVE MONEY TO GET MORE.: NEVER TRUE

## 2023-12-12 SDOH — ECONOMIC STABILITY: HOUSING INSECURITY
IN THE LAST 12 MONTHS, WAS THERE A TIME WHEN YOU DID NOT HAVE A STEADY PLACE TO SLEEP OR SLEPT IN A SHELTER (INCLUDING NOW)?: NO

## 2023-12-12 SDOH — ECONOMIC STABILITY: INCOME INSECURITY: HOW HARD IS IT FOR YOU TO PAY FOR THE VERY BASICS LIKE FOOD, HOUSING, MEDICAL CARE, AND HEATING?: NOT HARD AT ALL

## 2023-12-12 SDOH — ECONOMIC STABILITY: FOOD INSECURITY: WITHIN THE PAST 12 MONTHS, YOU WORRIED THAT YOUR FOOD WOULD RUN OUT BEFORE YOU GOT MONEY TO BUY MORE.: NEVER TRUE

## 2023-12-12 ASSESSMENT — ENCOUNTER SYMPTOMS
DIARRHEA: 0
COLOR CHANGE: 0
WHEEZING: 0
COUGH: 0
TROUBLE SWALLOWING: 0
EYE DISCHARGE: 0
BLOOD IN STOOL: 0
ABDOMINAL DISTENTION: 0
SHORTNESS OF BREATH: 0
EYE PAIN: 0

## 2023-12-12 ASSESSMENT — PATIENT HEALTH QUESTIONNAIRE - PHQ9
2. FEELING DOWN, DEPRESSED OR HOPELESS: 0
SUM OF ALL RESPONSES TO PHQ QUESTIONS 1-9: 0
SUM OF ALL RESPONSES TO PHQ9 QUESTIONS 1 & 2: 0
SUM OF ALL RESPONSES TO PHQ QUESTIONS 1-9: 0
SUM OF ALL RESPONSES TO PHQ QUESTIONS 1-9: 0
1. LITTLE INTEREST OR PLEASURE IN DOING THINGS: 0
SUM OF ALL RESPONSES TO PHQ QUESTIONS 1-9: 0

## 2023-12-12 NOTE — PROGRESS NOTES
[address] [address]  Dept: [phone]  Dept Fax: [phone]    Pearla Sandhoff is a 37 y.o. male who presents today for his medical conditions/complaintsas noted below. Pearla Sandhoff is c/o of   Chief Complaint   Patient presents with    Employment Physical         HPI:     Right hand pain and numbness  For four moth  Right handed            No results found for: \"LABA1C\"          ( goal A1Cis < 7)   No components found for: \"LABMICR\"  No results found for: \"LDLCHOLESTEROL\", \"LDLCALC\"    (goal LDL is <100)   AST (U/L)   Date Value   10/23/2020 23     ALT (U/L)   Date Value   10/23/2020 36     BUN (mg/dL)   Date Value   10/23/2020 12     BP Readings from Last 3 Encounters:   12/12/23 116/66   06/06/22 122/74   12/10/21 116/70          (goal 120/80)    No past medical history on file. No past surgical history on file. No family history on file. Social History     Tobacco Use    Smoking status: Former    Smokeless tobacco: Never   Substance Use Topics    Alcohol use: Not Currently      Current Outpatient Medications   Medication Sig Dispense Refill    acetaminophen (TYLENOL) 325 MG tablet Take 2 tablets by mouth every 6 hours as needed for Pain      predniSONE (DELTASONE) 20 MG tablet Take 1 tablet by mouth daily for 7 days 7 tablet 0    sildenafil (VIAGRA) 100 MG tablet Take 1 tablet by mouth daily as needed for Erectile Dysfunction 30 tablet 1    miconazole (MICOTIN) 2 % cream Apply topically 2 times daily. (Patient not taking: Reported on 12/12/2023) 1 each 1     No current facility-administered medications for this visit.      No Known Allergies    Health Maintenance   Topic Date Due    Hepatitis B vaccine (1 of 3 - 3-dose series) Never done    Varicella vaccine (1 of 2 - 2-dose childhood series) Never done    HIV screen  Never done    Hepatitis C screen  Never done    DTaP/Tdap/Td vaccine (1 - Tdap) Never done    Diabetes screen

## 2023-12-12 NOTE — PROGRESS NOTES
Visit Information    Have you changed or started any medications since your last visit including any over-the-counter medicines, vitamins, or herbal medicines? no   Are you having any side effects from any of your medications? -  no  Have you stopped taking any of your medications? Is so, why? -  no    Have you seen any other physician or provider since your last visit? No  Have you had any other diagnostic tests since your last visit? No  Have you been seen in the emergency room and/or had an admission to a hospital since we last saw you? No  Have you had your routine dental cleaning in the past 6 months? no    Have you activated your [employer] account? If not, what are your barriers?  No:      Patient Care Team:  Giovanny Morris MD as PCP - General (Internal Medicine)  Giovanny Morris MD as PCP - Empaneled Provider    Medical History Review  Past Medical, Family, and Social History reviewed and does contribute to the patient presenting condition    Health Maintenance   Topic Date Due    Hepatitis B vaccine (1 of 3 - 3-dose series) Never done    Varicella vaccine (1 of 2 - 2-dose childhood series) Never done    HIV screen  Never done    Hepatitis C screen  Never done    DTaP/Tdap/Td vaccine (1 - Tdap) Never done    Diabetes screen  Never done    Lipids  Never done    Depression Screen  06/06/2023    Flu vaccine (1) Never done    COVID-19 Vaccine (3 - 2023-24 season) 09/01/2023    Hepatitis A vaccine  Aged Out    Hib vaccine  Aged Out    HPV vaccine  Aged Out    Meningococcal (ACWY) vaccine  Aged Out    Pneumococcal 0-64 years Vaccine  Aged Out